# Patient Record
Sex: FEMALE | Race: WHITE | Employment: OTHER | URBAN - METROPOLITAN AREA
[De-identification: names, ages, dates, MRNs, and addresses within clinical notes are randomized per-mention and may not be internally consistent; named-entity substitution may affect disease eponyms.]

---

## 2024-01-10 ENCOUNTER — OFFICE VISIT (OUTPATIENT)
Facility: CLINIC | Age: 59
End: 2024-01-10

## 2024-01-10 VITALS
HEART RATE: 75 BPM | HEIGHT: 66 IN | BODY MASS INDEX: 24.43 KG/M2 | WEIGHT: 152 LBS | SYSTOLIC BLOOD PRESSURE: 123 MMHG | DIASTOLIC BLOOD PRESSURE: 70 MMHG

## 2024-01-10 DIAGNOSIS — K52.9 ILEITIS: Primary | ICD-10-CM

## 2024-01-10 PROCEDURE — 3008F BODY MASS INDEX DOCD: CPT | Performed by: INTERNAL MEDICINE

## 2024-01-10 PROCEDURE — 3078F DIAST BP <80 MM HG: CPT | Performed by: INTERNAL MEDICINE

## 2024-01-10 PROCEDURE — 3074F SYST BP LT 130 MM HG: CPT | Performed by: INTERNAL MEDICINE

## 2024-01-10 PROCEDURE — 99204 OFFICE O/P NEW MOD 45 MIN: CPT | Performed by: INTERNAL MEDICINE

## 2024-01-10 RX ORDER — PANTOPRAZOLE SODIUM 40 MG/1
TABLET, DELAYED RELEASE ORAL
COMMUNITY

## 2024-01-10 RX ORDER — ZINC SULFATE 50(220)MG
220 CAPSULE ORAL DAILY
COMMUNITY

## 2024-01-10 RX ORDER — DICLOFENAC POTASSIUM 25 MG/1
TABLET, FILM COATED ORAL AS DIRECTED
COMMUNITY

## 2024-01-10 RX ORDER — AMLODIPINE BESYLATE 5 MG/1
5 TABLET ORAL DAILY
COMMUNITY
Start: 2023-08-10

## 2024-01-10 NOTE — PROGRESS NOTES
Ros Bourne is a 58 year old female.    HPI:     Chief Complaint   Patient presents with    Follow - Up       The patient is a 58-year-old female who has a history of thyroid disorder, prior hip surgery and colonoscopy who was noted to have ileitis who is seen today for GI consultation.    The patient's colonoscopy was done at [facility] through [facility] in Nov 2023 (colon cancer screening ).  I do not have the colonoscopy report but pathology was reviewed and showed evidence of inflammation in the terminal ileum, biopsy of the colon were normal.    She otherwise denies any symptoms of colitis or Crohn's.  She has no weight loss, change in bowel habits or diarrhea.  She does get occasional looser stools after drinking wine.  She is on digestive enzymes and sees a holistic doctor.  She denies any family history of inflammatory bowel disease/Crohn's, she does have autoimmune in the family.    Jeanie Doshi    HISTORY:  History reviewed. No pertinent past medical history.   History reviewed. No pertinent surgical history.   History reviewed. No pertinent family history.   Social History:   Social History     Socioeconomic History    Marital status: Unknown   Tobacco Use    Smoking status: Some Days    Smokeless tobacco: Never   Substance and Sexual Activity    Alcohol use: Yes     Comment: Occasional    Drug use: Yes     Types: Cannabis        Medications (Active prior to today's visit):  Current Outpatient Medications   Medication Sig Dispense Refill    Diclofenac Potassium 25 MG Oral Tab Take by mouth As Directed.      amLODIPine 5 MG Oral Tab Take 1 tablet (5 mg total) by mouth daily.      pantoprazole 40 MG Oral Tab EC TAKE 1 TABLET BY MOUTH EVERY DAY FOR 60 DAYS      multivitamin Oral Chew Tab Chew 1 tablet by mouth daily.      zinc sulfate 220 (50 Zn) MG Oral Cap Take 1 capsule (220 mg total) by mouth daily.      Levothyroxine Sodium 137 MCG Oral Tab Take 137 mcg by mouth before breakfast.          Allergies:  Allergies   Allergen Reactions    Tetrix UNKNOWN    Latex RASH     Direct contact         ROS:   The patient denies any chest pain or shortness of breath,  No neurologic or dermatologic symptoms.     PHYSICAL EXAM:   Blood pressure 123/70, pulse 75, height 5' 6\" (1.676 m), weight 152 lb (68.9 kg).    The patient appears their stated age and is in no acute distress  HEENT- anicteric sclera, neck no lymphadnopathy, OP- clear with no masses or lesions  Chest- Clear bilaterally, no wheezing,  Heart- regular rate, no murmur or gallop  Abdomen- Soft and nontender, nondistended  Ext- no clubbing or cyanosis  Skin- no rashes or lesions  Neuro- appropriate response, alert, no confusion  .  ASSESSMENT/PLAN:   Assessment     Terminal ileitis    Patient has ileitis noted on recent colonoscopy.  Discussed the possible causes including potential related to medication/aspirin or NSAIDs.  Her colonoscopy was about 2 to 3 months after her recent hip surgery.  Not sure if this could have played a role in the pattern.  Patient does not really have symptoms ascribable to IBD.  Discussed the potential role of treatment.  I would like to set up a stool test to check for inflammation and would likely repeat a colonoscopy in about a years time to determine if there is any escalation.  At this point we will hold off on any further therapy.  We discussed treatment of IBD/Crohn's particular in light of the fact that if we do establish a firm diagnosis.  Patient agrees the above approach.  Risks of progression reviewed.    Plan  Ileitis noted on colonoscopy - possible Crohns  - check stool test for inflammation  - avoid NSAIDS /aspirin  - discussed possible therapies  - records from prior colonoscopy  - follow up colonoscopy in 1 year    Call if symptoms.        Orders This Visit:  No orders of the defined types were placed in this encounter.      Meds This Visit:  Requested Prescriptions      No prescriptions requested or  ordered in this encounter       Imaging & Referrals:  None       Tulio Kwong MD  [facility]

## 2024-01-10 NOTE — PATIENT INSTRUCTIONS
Ileitis noted on colonoscopy - possible Crohns  - check stool test for inflammation  - avoid NSAIDS /aspirin  - discussed possible therapies  - records from prior colonoscopy  - follow up colonoscopy in 1 year    Call if symptoms.

## 2024-01-12 ENCOUNTER — LAB ENCOUNTER (OUTPATIENT)
Dept: LAB | Facility: HOSPITAL | Age: 59
End: 2024-01-12
Attending: INTERNAL MEDICINE
Payer: COMMERCIAL

## 2024-01-12 ENCOUNTER — TELEPHONE (OUTPATIENT)
Facility: CLINIC | Age: 59
End: 2024-01-12

## 2024-01-12 DIAGNOSIS — K52.9 ILEITIS: ICD-10-CM

## 2024-01-12 PROCEDURE — 83993 ASSAY FOR CALPROTECTIN FECAL: CPT

## 2024-01-12 NOTE — TELEPHONE ENCOUNTER
Dr. Kwong    Colonoscopy report dated 11/28/2023 from [facility] received and placed on your desk for review.    Thank you

## 2024-01-15 LAB — CALPROTECTIN STL-MCNT: 113 ΜG/G (ref ?–50)

## 2024-01-25 ENCOUNTER — TELEPHONE (OUTPATIENT)
Facility: CLINIC | Age: 59
End: 2024-01-25

## 2024-01-25 NOTE — TELEPHONE ENCOUNTER
Chitra    Would you be willing to give input on fecal calprotectin results since Dr. Kwong out until Tuesday?    Thank you

## 2024-01-29 NOTE — TELEPHONE ENCOUNTER
Dr. Elenita PUCKETT    I have her scheduled for a video visit with you tomorrow at 4pm. She could not do 3pm due to physical therapy    Thank you    I reviewed below complete message from Dr. Kwong with Ros.  She voiced understanding and accepted virtual visit for tomorrow at 4pm.  I just need someone with access to open it.  I reviewed how to log into [facility] and how to set up virtual visit.  Aware she needs to to E Check in beforehand.    Your Appointments      Tuesday January 30, 2024  4:00 PM  Video Visit with Tulio Kwong MD  [facility] [address] [address] [phone]   Please verify your telehealth insurance benefits prior to your appointment.    You must be in the [facility] during the virtual visit.     Please use the [facility] [employer] and launch the video visit 10 minutes prior to your scheduled appointment time to ensure your camera and microphone are working properly. Once the video visit has started you will be placed in a waiting room until the provider begins the visit.     You will receive an email confirmation with instructions.  If you have questions, call your doctor's office directly.    If you are having issues or need to use a desktop/laptop, please follow the below steps:        1.       Close out all other open apps (could be competing for audio resources)  2.       Disable Bluetooth  3.       Reboot mobile device before joining the video  4.       Come off Wi-Fi and switch over to Data    Please see our Video Visit Tip Sheet if you need additional assistance.     If you believe this is an emergency, please dial 911 immediately.

## 2024-01-29 NOTE — TELEPHONE ENCOUNTER
RN to contact the patient, her stool test is subtly elevated/close to borderline.  I have reviewed her prior colonoscopy report and it is as we discussed, consistent with small focal erosions/ulcerations in the ileum and can be seen with Crohn's disease.    Would like to set up a telemedicine visit to discuss her options, I have 1 idea that may try mesalamine which is a pill I discussed with her that has very minimal side effect profile but be helpful to cut down any irritation in her small bowel.  We could then recheck the stool test in 6 months to see if removed.  This may be a way to follow her response and inflammation.    I be willing to set up a telemedicine on Tuesday afternoons, after 3 PM, Thursday afternoon after 3 PM or Friday afternoon after 4 PM

## 2024-01-30 ENCOUNTER — TELEMEDICINE (OUTPATIENT)
Facility: CLINIC | Age: 59
End: 2024-01-30
Payer: COMMERCIAL

## 2024-01-30 DIAGNOSIS — K52.9 ILEITIS: Primary | ICD-10-CM

## 2024-01-30 PROCEDURE — 99442 PHONE E/M BY PHYS 11-20 MIN: CPT | Performed by: INTERNAL MEDICINE

## 2024-01-30 NOTE — PROGRESS NOTES
Virtual Telephone Check-In    Ros Bourne verbally consents to a Virtual/Telephone Check-In visit on 01/30/24.  Patient has been referred to the [facility] website at [email or web address] to review the yearly Consent to Treat document.    Patient understands and accepts financial responsibility for any deductible, co-insurance and/or co-pays associated with this service.    Duration of the service: 18 minutes      Summary of topics discussed:     I have reviewed the patient's stool test with her, her fecal calprotectin was subtly elevated but tracked only in the border line range.  Patient has no symptoms of Crohn's.  She does use NSAIDs and continues to smoke several cigarettes a week.    We discussed lifestyle adjustments, cutting out NSAIDs if possible (she takes it for recent hip replacement) and repeating stool test/fecal calprotectin in 6 months time.  Differential was discussed and includes Crohn's disease.  Patient has no symptoms at this time, blood work is looked okay.  Like to see if her numbers improved.    We do plan for colonoscopy in 1 years time to reevaluate, we could consider CT enterography if ongoing symptoms.    Tulio Kwong MD

## 2024-08-14 ENCOUNTER — APPOINTMENT (OUTPATIENT)
Dept: LAB | Facility: HOSPITAL | Age: 59
End: 2024-08-14
Attending: INTERNAL MEDICINE
Payer: COMMERCIAL

## 2024-08-14 PROCEDURE — 83993 ASSAY FOR CALPROTECTIN FECAL: CPT

## 2024-08-16 ENCOUNTER — LAB ENCOUNTER (OUTPATIENT)
Dept: LAB | Facility: HOSPITAL | Age: 59
End: 2024-08-16
Attending: INTERNAL MEDICINE
Payer: COMMERCIAL

## 2024-08-16 DIAGNOSIS — K52.9 ILEITIS: ICD-10-CM

## 2024-08-17 LAB — CALPROTECTIN STL-MCNT: 16.3 ΜG/G (ref ?–50)

## 2024-08-21 ENCOUNTER — TELEPHONE (OUTPATIENT)
Facility: CLINIC | Age: 59
End: 2024-08-21

## 2024-08-21 NOTE — TELEPHONE ENCOUNTER
Results faxed to primary care provider  Oriana Burnett MD   [address] [place] [id number]   [phone] (Work)   [phone] (Fax)     Patient viewed below result note in MyChart:  Seen by patient Ros Bourne on 8/20/2024  5:53 PM

## 2024-08-21 NOTE — TELEPHONE ENCOUNTER
----- Message from Tulio Kwong sent at 8/20/2024  5:51 PM CDT -----  Stool test negative for inflammation -this would go against active inflammation/active Crohn's disease.  I would advise you follow-up in the office in January and can discuss any symptoms or issues or concerns.  Ongoing monitoring with repeat stool test in 2025.    RN to fax over results to PCP

## 2024-09-20 ENCOUNTER — TELEPHONE (OUTPATIENT)
Facility: CLINIC | Age: 59
End: 2024-09-20

## 2024-09-20 DIAGNOSIS — K52.9 ILEITIS: ICD-10-CM

## 2024-09-20 DIAGNOSIS — Z12.11 COLON CANCER SCREENING: Primary | ICD-10-CM

## 2024-09-20 NOTE — TELEPHONE ENCOUNTER
Chart reviewed, advised colonoscopy 1 year from 2023 exam so ok to set up in Jan 2025.    Colonoscopy for ileitis /CRC screening  Golytely prep - need pharmacy to send prep  MAC     Please see if the pt needs to take abx before procedure for here prior hip surgery

## 2024-09-20 NOTE — TELEPHONE ENCOUNTER
Dr. Kwong    Telemedicine visit from 1/30/24 says to plan for colonoscopy in 1 years time.  Result note for stool test says follow up in office in January.  Do you just want her to follow up in office in January given stool test results or should she still plan for that 1 year recall colonoscopy which would be due soon?    Thank you

## 2024-09-20 NOTE — TELEPHONE ENCOUNTER
Patient calling states had a stool test and inflammation came back negative. States if she has to do colonoscopy in December. Please call and advise.

## 2024-09-23 NOTE — TELEPHONE ENCOUNTER
Schedulers:  patient informed of below.  Can you please call patient to schedule?  Please route encounter back to RN once scheduled - per patient Dr. Rhoades will likely want her to take antibiotics pre procedure so we will have to follow up with that once a date is set up.    Thank you

## 2024-09-25 NOTE — TELEPHONE ENCOUNTER
Prep kit sent to pharmacy at [facility] on [address] in [place]. No further actions needed.

## 2024-09-25 NOTE — TELEPHONE ENCOUNTER
Scheduled for:  Colonoscopy [address]  Provider Name:  Dr. Kwong  Date:  1/7/2025  Location:   [facility]  Sedation:  MAC  Time:  12:30 PM - Patient is aware NE will call the day before with arrival time.  Prep:  Golytely  Meds/Allergies Reconciled?:  Physician reviewed   Diagnosis with codes:  Colon cancer screening Z12.11; Ileitis K52.9  Was patient informed to call insurance with codes (Y/N):  Yes, I confirmed [employer] PPO insurance with the patient.   Referral sent?:  Referral was sent at the time of electronic surgical scheduling.   EM or [facility] notified?:  I sent an electronic request to Endo Scheduling and received a confirmation today.   Medication Orders: Hold multivitamins/supplements one week prior to procedure.  Misc Orders:  May need to take antibiotic prior to procedure - GI RN will follow up with Dr. Rhoades.     Further instructions given by staff: I discussed the prep instructions with the patient which she verbally understood and is aware that I will send the instructions today.

## 2024-09-25 NOTE — TELEPHONE ENCOUNTER
GI RNs -    Patient is scheduled for a colon[facility] Please follow up with Dr. Rhoades regarding the antibiotic patient may needs to take prior to the procedure.    Thank you!

## 2024-09-25 NOTE — TELEPHONE ENCOUNTER
GI MAs -    Please resend bowel prep to the [facility] at [address] [facility] [phone] (pharmacy on file is closed).    Thank you!

## 2024-12-30 ENCOUNTER — TELEPHONE (OUTPATIENT)
Facility: CLINIC | Age: 59
End: 2024-12-30

## 2024-12-30 NOTE — TELEPHONE ENCOUNTER
Called patient - went over prep instructions and what diet she should follow prior to the procedure. Also, informed her to check her Cl[employer] message we sent in September to review the prep instructions.    No further action at this time.

## 2024-12-30 NOTE — TELEPHONE ENCOUNTER
Schedulers:  patient requesting to review prep.  Can you please assist?  I sent prep to pharmacy again as requested.  I will get back to her about antibiotics pre procedure-waiting for MD to respond.    Thank you

## 2024-12-30 NOTE — TELEPHONE ENCOUNTER
Patient called request the preparation for the procedure. Please send the preparation to the [facility] pharmacy in [place] on [address].     Patient also asked to go over preparation instructions. Please call.

## 2024-12-31 ENCOUNTER — TELEPHONE (OUTPATIENT)
Facility: CLINIC | Age: 59
End: 2024-12-31

## 2024-12-31 NOTE — TELEPHONE ENCOUNTER
Patient states that she is returning an RN's call from this morning, regarding an Antibiotic before 1/7/25 Colonoscopy.  Please call

## 2024-12-31 NOTE — TELEPHONE ENCOUNTER
Tulio Kwong MD       12/30/24  5:46 PM  Note     Amoxicillin antibiotic 2000 mg to be taken preprocedure, sent to [facility] pharmacy as per request.

## 2025-01-06 ENCOUNTER — TELEPHONE (OUTPATIENT)
Facility: CLINIC | Age: 60
End: 2025-01-06

## 2025-01-06 NOTE — TELEPHONE ENCOUNTER
Patient is scheduled for a Colonoscopy 1/7/25.  She was prescribed an Antibiotic to take prior to the procedure due to a joint replacement a year ago, but she needs to know when to take it.  Please call

## 2025-01-06 NOTE — TELEPHONE ENCOUNTER
Patient contacted  Reviewed she is to take the antibiotic 1 hour prior to procedure  All questions answered.

## 2025-01-06 NOTE — TELEPHONE ENCOUNTER
Patient is calling for directions for 1/7/25 Colonoscopy.   She states that she put milk in her coffee this morning.  Please call

## 2025-01-06 NOTE — TELEPHONE ENCOUNTER
Patient contacted.  Aware the milk in her coffee this morning was ok because allowed light breakfast then only clear liquids.  All questions answered to patient's satisfaction.

## 2025-01-07 ENCOUNTER — HOSPITAL ENCOUNTER (OUTPATIENT)
Age: 60
Setting detail: HOSPITAL OUTPATIENT SURGERY
Discharge: HOME | End: 2025-01-07
Attending: INTERNAL MEDICINE | Admitting: INTERNAL MEDICINE
Payer: COMMERCIAL

## 2025-01-07 ENCOUNTER — ANESTHESIA (OUTPATIENT)
Dept: ENDOSCOPY | Age: 60
End: 2025-01-07
Payer: COMMERCIAL

## 2025-01-07 ENCOUNTER — ANESTHESIA EVENT (OUTPATIENT)
Dept: ENDOSCOPY | Age: 60
End: 2025-01-07
Payer: COMMERCIAL

## 2025-01-07 VITALS
WEIGHT: 140 LBS | RESPIRATION RATE: 15 BRPM | DIASTOLIC BLOOD PRESSURE: 67 MMHG | BODY MASS INDEX: 22.5 KG/M2 | HEART RATE: 58 BPM | SYSTOLIC BLOOD PRESSURE: 144 MMHG | OXYGEN SATURATION: 98 % | HEIGHT: 66 IN

## 2025-01-07 DIAGNOSIS — K52.9 ILEITIS: ICD-10-CM

## 2025-01-07 DIAGNOSIS — Z12.11 COLON CANCER SCREENING: ICD-10-CM

## 2025-01-07 PROCEDURE — 45385 COLONOSCOPY W/LESION REMOVAL: CPT | Performed by: INTERNAL MEDICINE

## 2025-01-07 PROCEDURE — 99070 SPECIAL SUPPLIES PHYS/QHP: CPT | Performed by: INTERNAL MEDICINE

## 2025-01-07 PROCEDURE — 45380 COLONOSCOPY AND BIOPSY: CPT | Performed by: INTERNAL MEDICINE

## 2025-01-07 PROCEDURE — 88305 TISSUE EXAM BY PATHOLOGIST: CPT | Performed by: INTERNAL MEDICINE

## 2025-01-07 RX ORDER — NALOXONE HYDROCHLORIDE 0.4 MG/ML
0.08 INJECTION, SOLUTION INTRAMUSCULAR; INTRAVENOUS; SUBCUTANEOUS ONCE AS NEEDED
Status: DISCONTINUED | OUTPATIENT
Start: 2025-01-07 | End: 2025-01-07

## 2025-01-07 RX ORDER — SODIUM CHLORIDE, SODIUM LACTATE, POTASSIUM CHLORIDE, CALCIUM CHLORIDE 600; 310; 30; 20 MG/100ML; MG/100ML; MG/100ML; MG/100ML
INJECTION, SOLUTION INTRAVENOUS CONTINUOUS
Status: DISCONTINUED | OUTPATIENT
Start: 2025-01-07 | End: 2025-01-07

## 2025-01-07 RX ORDER — LIDOCAINE HYDROCHLORIDE 10 MG/ML
INJECTION, SOLUTION EPIDURAL; INFILTRATION; INTRACAUDAL; PERINEURAL AS NEEDED
Status: DISCONTINUED | OUTPATIENT
Start: 2025-01-07 | End: 2025-01-07 | Stop reason: SURG

## 2025-01-07 RX ADMIN — SODIUM CHLORIDE, SODIUM LACTATE, POTASSIUM CHLORIDE, CALCIUM CHLORIDE: 600; 310; 30; 20 INJECTION, SOLUTION INTRAVENOUS at 10:35:00

## 2025-01-07 RX ADMIN — LIDOCAINE HYDROCHLORIDE 30 MG: 10 INJECTION, SOLUTION EPIDURAL; INFILTRATION; INTRACAUDAL; PERINEURAL at 10:36:00

## 2025-01-07 NOTE — OPERATIVE REPORT
[facility] Endoscopy Report    Date of procedure-January 7, 2025    Preoperative Diagnosis:  -Ileitis history  -Colorectal cancer screening      Postoperative Diagnosis:  -Colon polyps x 2  -Diverticulosis  -Internal hemorrhoids  -Normal terminal ileum    Procedure:    Colonoscopy       Surgeon:  Tulio Kwong M.D.    Anesthesia:  MAC     Technique:  After informed consent, the patient was placed in the left lateral recumbent position.  Digital rectal examination revealed no palpable intraluminal abnormalities.  An Olympus variable stiffness 190 series HD colonoscope was inserted into the rectum and advanced under direct vision by following the lumen to the cecum.  The colon was examined upon withdrawal in the left lateral position.    The procedure was well tolerated without immediate complication.      Findings:  The preparation of the colon was good.  The terminal ileum was examined for 4 cm and visually normal-biopsies taken from the terminal ileum.  The ileocecal valve was well preserved. The visualized colonic mucosa from the cecum to the anal verge was normal with an intact vascular pattern.    Colon polyps x 2 removed as follows;  -Descending x 1, sessile 5 mm in size and cold snare removed.  -Sigmoid x 1, diminutive removed by cold forceps technique.  Both polypectomy sites inspected and found to be free bleeding specimens retrieved and sent for analysis.    Diverticulosis located in the sigmoid and descending colon, no diverticulitis.    Small internal hemorrhoids noted on retroflexed view.    Estimated blood loss-insignificant  Specimens-see above    Impression:  -Colon polyps x 2  -Diverticulosis  -Internal hemorrhoids  -Normal terminal ileum    Recommendations:  - Post polypectomy instructions given  - Repeat colonoscopy in 5 years  - High fiber diet for diverticular disease  - Symptomatic treatment of hemorrhoids      Tulio Kwong MD  1/7/2025  11:06 AM

## 2025-01-07 NOTE — DISCHARGE INSTRUCTIONS
Home Care Instructions for Colonoscopy with Sedation    Diet:  - Resume your regular diet as tolerated unless otherwise instructed.  - Start with light meals to minimize bloating.  - Do not drink alcohol today.    Medication:  - If you have questions about resuming your normal medications, please contact your Primary Care Physician.    Activities:  - Take it easy today. Do not return to work today.  - Do not drive today.  - Do not operate any machinery today (including kitchen equipment).    Colonoscopy:  - You may notice some rectal \"spotting\" (a little blood on the toilet tissue) for a day or two after the exam. This is normal.  - If you experience any rectal bleeding (not spotting), persistent tenderness or sharp severe abdominal pains, oral temperature over 100 degrees Fahrenheit, light-headedness or dizziness, or any other problems, contact your doctor.    **If unable to reach your doctor, please go to the [facility] Emergency Room**    - Your referring physician will receive a full report of your examination.  - If you do not hear from your doctor's office within two weeks of your biopsy, please call them for your results.    You may be able to see your laboratory results in [facility] between 4 and 7 business days.  In some cases, your physician may not have viewed the results before they are released to [facility].  If you have questions regarding your results contact the physician who ordered the test/exam by phone or via [facility] by choosing \"Ask a Medical Question.\"

## 2025-01-07 NOTE — ANESTHESIA PREPROCEDURE EVALUATION
Anesthesia PreOp Note    HPI:     Ros Bourne is a 59 year old female who presents for preoperative consultation requested by: Tulio Kwong MD    Date of Surgery: 1/7/2025    Procedure(s):  COLONOSCOPY  Indication: Colon cancer screening /Ileitis    Relevant Problems   No relevant active problems       NPO:  Last Liquid Consumption Date: 01/07/25  Last Liquid Consumption Time: 0800  Last Solid Consumption Date: 01/06/25  Last Solid Consumption Time: 1100  Last Liquid Consumption Date: 01/07/25          History Review:  There are no active problems to display for this patient.      Past Medical History:    Disorder of thyroid    High blood pressure       Past Surgical History:   Procedure Laterality Date    Colonoscopy      Thyroidectomy      Total hip replacement Bilateral        Prescriptions Prior to Admission[1]  Current Medications and Prescriptions Ordered in Epic[2]    Allergies[3]    History reviewed. No pertinent family history.  Social History     Socioeconomic History    Marital status: Single   Tobacco Use    Smoking status: Some Days    Smokeless tobacco: Never    Tobacco comments:     occasionally   Vaping Use    Vaping status: Never Used   Substance and Sexual Activity    Alcohol use: Yes     Comment: Occasional    Drug use: Yes     Types: Cannabis     Comment: sleep gummies, occasional       Available pre-op labs reviewed.             Vital Signs:  Body mass index is 22.6 kg/m².   height is 1.676 m (5' 6\") and weight is 63.5 kg (140 lb). Her blood pressure is 124/82 and her pulse is 57. Her respiration is 10 and oxygen saturation is 99%.   Vitals:    12/30/24 1517 01/07/25 0945   BP:  124/82   Pulse:  57   Resp:  10   SpO2:  99%   Weight: 63.5 kg (140 lb)    Height: 1.676 m (5' 6\")         Anesthesia Evaluation     Patient summary reviewed and Nursing notes reviewed    No history of anesthetic complications   Airway   Mallampati: II  TM distance: <3 FB  Neck ROM: full  Dental - Dentition  appears grossly intact     Pulmonary - negative ROS and normal exam   Cardiovascular - normal exam  (+) hypertension    Neuro/Psych - negative ROS     GI/Hepatic/Renal - negative ROS     Endo/Other    (+) hypothyroidism  Abdominal                  Anesthesia Plan:   ASA:  2  Plan:   MAC  Informed Consent Plan and Risks Discussed With:  Patient      I have informed Ros Bourne and/or legal guardian or family member of the nature of the anesthetic plan, benefits, risks including possible dental damage if relevant, major complications, and any alternative forms of anesthetic management.   All of the patient's questions were answered to the best of my ability. The patient desires the anesthetic management as planned.  Valorie Cooper MD  1/7/2025 10:12 AM  Present on Admission:  **None**           [1]   Medications Prior to Admission   Medication Sig Dispense Refill Last Dose/Taking    Bright, Zingiber officinalis, (BRIGHT OR) Take by mouth.   12/31/2024    NON FORMULARY Immune Wellness Blend   Taking    amoxicillin 500 MG Oral Tab Take 4 tablets (2,000 mg total) by mouth one time for 1 dose. 4 tablet 0 1/7/2025 Morning    amLODIPine 5 MG Oral Tab Take 1 tablet (5 mg total) by mouth daily.   1/6/2025 Morning    multivitamin Oral Chew Tab Chew 1 tablet by mouth daily.   12/31/2024    zinc sulfate 220 (50 Zn) MG Oral Cap Take 1 capsule (220 mg total) by mouth daily.   12/31/2024    Levothyroxine Sodium 137 MCG Oral Tab Take 137 mcg by mouth before breakfast.   1/6/2025 Morning    polyethylene glycol, PEG 3350-KCl-NaBcb-NaCl-NaSulf, 236 g Oral Recon Soln Take 4,000 mL by mouth As Directed. May substitute prescription with Golytely/Nulytely/Gavilyte and or generic equivalent, if needed. Take bowel preparation as provided by Gastroenterology office, or visit our website at [email or web address] 4000 mL 0    [2]   Current Facility-Administered Medications Ordered in Epic    Medication Dose Route Frequency Provider Last Rate Last Admin    lactated ringers infusion   Intravenous Continuous Tulio Kwong MD 20 mL/hr at 01/07/25 0930 New Bag at 01/07/25 0930     No current [facility]-ordered outpatient medications on file.   [3]   Allergies  Allergen Reactions    Tetracyclines & Related HIVES and OTHER (SEE COMMENTS)     Throat scratchy and itchy 30 years ago.    Throat scratchy and itchy 40 years ago.    Tetrix UNKNOWN    Latex RASH     Direct contact

## 2025-01-07 NOTE — ANESTHESIA POSTPROCEDURE EVALUATION
Patient: Ros Bourne    Procedure Summary       Date: 01/07/25 Room / Location: [facility] ENDOSCOPY 01 / [facility] ENDO    Anesthesia Start: 1034 Anesthesia Stop: 1106    Procedure: COLONOSCOPY Diagnosis:       Colon cancer screening      Ileitis      (polyps, hemorrhoids, diverticulosis)    Surgeons: Tulio Kwong MD Anesthesiologist: Valorie Cooper MD    Anesthesia Type: MAC ASA Status: 2            Anesthesia Type: MAC    Vitals Value Taken Time   /65 01/07/25 1105   Temp  01/07/25 1106   Pulse 74 01/07/25 1105   Resp 17 01/07/25 1105   SpO2 94 % 01/07/25 1105   Vitals shown include unfiled device data.    [facility] AN Post Evaluation:   Patient Evaluated in PACU  Patient Participation: complete - patient participated  Level of Consciousness: sleepy but conscious  Pain Score: 0  Pain Management: adequate  Airway Patency:patent  Yes    Nausea/Vomiting: none  Cardiovascular Status: acceptable  Respiratory Status: acceptable  Postoperative Hydration acceptable      Valorie Cooper MD  1/7/2025 11:06 AM

## 2025-01-07 NOTE — H&P
History & Physical Examination    Patient Name: Ros Bourne  MRN: [id number]  [facility]: [id number]  YOB: 1965    Diagnosis:   CRC screening  Hx ileitis      Prescriptions Prior to Admission[1]  Current Facility-Administered Medications   Medication Dose Route Frequency    lactated ringers infusion   Intravenous Continuous       Allergies: Allergies[2]    Past Medical History:    Disorder of thyroid    High blood pressure     Past Surgical History:   Procedure Laterality Date    Colonoscopy      Thyroidectomy      Total hip replacement Bilateral      History reviewed. No pertinent family history.  Social History     Tobacco Use    Smoking status: Some Days    Smokeless tobacco: Never    Tobacco comments:     occasionally   Substance Use Topics    Alcohol use: Yes     Comment: Occasional       SYSTEM Check if Review is Normal Check if Physical Exam is Normal If not normal, please explain:   HEENT [x ] [ x]    NECK & BACK [x ] [x ]    HEART [x ] [ x]    LUNGS [x ] [ x]    ABDOMEN [x ] [x ]    UROGENITAL [ ] [ ]    EXTREMITIES [x ] [x ]    OTHER        [ x ] I have discussed the risks and benefits and alternatives with the patient/family.  They understand and agree to proceed with plan of care.  [ x ] I have reviewed the History and Physical done within the last 30 days.  Any changes noted above.    Tulio Kwong MD  1/7/2025  10:27 AM         [1]   Medications Prior to Admission   Medication Sig Dispense Refill Last Dose/Taking    Sandhya, Zingiber officinalis, (SANDHYA OR) Take by mouth.   12/31/2024    NON FORMULARY Immune Wellness Blend   Taking    amoxicillin 500 MG Oral Tab Take 4 tablets (2,000 mg total) by mouth one time for 1 dose. 4 tablet 0 1/7/2025 Morning    amLODIPine 5 MG Oral Tab Take 1 tablet (5 mg total) by mouth daily.   1/6/2025 Morning    multivitamin Oral Chew Tab Chew 1 tablet by mouth daily.   12/31/2024    zinc sulfate 220 (50 Zn) MG Oral Cap Take 1 capsule (220 mg total) by  mouth daily.   12/31/2024    Levothyroxine Sodium 137 MCG Oral Tab Take 137 mcg by mouth before breakfast.   1/6/2025 Morning    polyethylene glycol, PEG 3350-KCl-NaBcb-NaCl-NaSulf, 236 g Oral Recon Soln Take 4,000 mL by mouth As Directed. May substitute prescription with Golytely/Nulytely/Gavilyte and or generic equivalent, if needed. Take bowel preparation as provided by Gastroenterology office, or visit our website at [email or web address] 4000 mL 0    [2]   Allergies  Allergen Reactions    Tetracyclines & Related HIVES and OTHER (SEE COMMENTS)     Throat scratchy and itchy 30 years ago.    Throat scratchy and itchy 40 years ago.    Tetrix UNKNOWN    Latex RASH     Direct contact

## 2025-01-10 ENCOUNTER — TELEPHONE (OUTPATIENT)
Facility: CLINIC | Age: 60
End: 2025-01-10

## 2025-01-10 NOTE — TELEPHONE ENCOUNTER
----- Message from Tulio Kwong sent at 1/9/2025  6:23 PM CST -----  Colonoscopy results look okay, no signs of inflammation in the terminal ileum as clinically questioned.  Colon polyps x 2 removed which were benign.  Would advise recall colonoscopy in 5 years time.    Call or follow-up with questions or issues.

## 2025-01-10 NOTE — TELEPHONE ENCOUNTER
Health Maintenance Updated.    5 year colonoscopy recall entered into patient outreach in [facility].  Next colonoscopy will be due 1/7/2030.    Left message on voicemail.

## 2025-01-20 ENCOUNTER — TELEPHONE (OUTPATIENT)
Facility: CLINIC | Age: 60
End: 2025-01-20

## 2025-01-20 NOTE — TELEPHONE ENCOUNTER
Patient called to speak with a nurse in regards to a bill that she received from Dr. Kwong's office, patient isn't sure why it did not go through her insurance. Please call.

## 2025-01-20 NOTE — TELEPHONE ENCOUNTER
I called the patient back.  She told me that the issue is already resolved, she just needed to update the insurance we had on file.

## (undated) DEVICE — KIT CLEAN ENDOKIT 1.1OZ GOWNX2

## (undated) DEVICE — LASSO POLYPECTOMY SNARE: Brand: LASSO

## (undated) DEVICE — MEDI-VAC NON-CONDUCTIVE SUCTION TUBING 6MM X 1.8M (6FT.) L: Brand: CARDINAL HEALTH

## (undated) DEVICE — GIJAW SINGLE-USE BIOPSY FORCEPS WITH NEEDLE: Brand: GIJAW

## (undated) DEVICE — 60 ML SYRINGE REGULAR TIP: Brand: MONOJECT

## (undated) DEVICE — KIT ENDO ORCAPOD 160/180/190

## (undated) DEVICE — V2 SPECIMEN COLLECTION MANIFOLD KIT: Brand: NEPTUNE

## (undated) DEVICE — Device

## (undated) NOTE — LETTER
[facility]  [address], [place], [place]    Authorization for Surgical Operation and Procedure                               I hereby authorize Tulio Kwong MD, my physician and his/her assistants (if applicable), which may include medical students, residents, and/or fellows, to perform the following surgical operation/ procedure and administer such anesthesia as may be determined necessary by my physician: Operation/Procedure name (s) COLONOSCOPY on Ros Bourne   2.   I recognize that during the surgical operation/procedure, unforeseen conditions may necessitate additional or different procedures than those listed above.  I, therefore, further authorize and request that the above-named surgeon, assistants, or designees perform such procedures as are, in their judgment, necessary and desirable.    3.   My surgeon/physician has discussed prior to my surgery the potential benefits, risks and side effects of this procedure; the likelihood of achieving goals; and potential problems that might occur during recuperation.  They also discussed reasonable alternatives to the procedure, including risks, benefits, and side effects related to the alternatives and risks related to not receiving this procedure.  I have had all my questions answered and I acknowledge that no guarantee has been made as to the result that may be obtained.    4.   Should the need arise during my operation/procedure, which includes change of level of care prior to discharge, I also consent to the administration of blood and/or blood products.  Further, I understand that despite careful testing and screening of blood or blood products by collecting agencies, I may still be subject to ill effects as a result of receiving a blood transfusion and/or blood products.  The following are some, but not all, of the potential risks that can occur: fever and allergic reactions, hemolytic reactions, transmission of diseases such  as Hepatitis, AIDS and Cytomegalovirus (CMV) and fluid overload.  In the event that I wish to have an autologous transfusion of my own blood, or a directed donor transfusion, I will discuss this with my physician.  Check only if Refusing Blood or Blood Products  I understand refusal of blood or blood products as deemed necessary by my physician may have serious consequences to my condition to include possible death. I hereby assume responsibility for my refusal and release the hospital, its personnel, and my physicians from any responsibility for the consequences of my refusal.    o  Refuse   5.   I authorize the use of any specimen, organs, tissues, body parts or foreign objects that may be removed from my body during the operation/procedure for diagnosis, research or teaching purposes and their subsequent disposal by hospital authorities.  I also authorize the release of specimen test results and/or written reports to my treating physician on the hospital medical staff or other referring or consulting physicians involved in my care, at the discretion of the Pathologist or my treating physician.    6.   I consent to the photographing or videotaping of the operations or procedures to be performed, including appropriate portions of my body for medical, scientific, or educational purposes, provided my identity is not revealed by the pictures or by descriptive texts accompanying them.  If the procedure has been photographed/videotaped, the surgeon will obtain the original picture, image, videotape or CD.  The hospital will not be responsible for storage, release or maintenance of the picture, image, tape or CD.    7.   I consent to the presence of a  or observers in the operating room as deemed necessary by my physician or their designees.    8.   I recognize that in the event my procedure results in extended X-Ray/fluoroscopy time, I may develop a skin reaction.    9. If I have a Do Not Attempt  Resuscitation (DNAR) order in place, that status will be suspended while in the operating room, procedural suite, and during the recovery period unless otherwise explicitly stated by me (or a person authorized to consent on my behalf). The surgeon or my attending physician will determine when the applicable recovery period ends for purposes of reinstating the DNAR order.  10. Patients having a sterilization procedure: I understand that if the procedure is successful the results will be permanent and it will therefore be impossible for me to inseminate, conceive, or bear children.  I also understand that the procedure is intended to result in sterility, although the result has not been guaranteed.   11. I acknowledge that my physician has explained sedation/analgesia administration to me including the risk and benefits I consent to the administration of sedation/analgesia as may be necessary or desirable in the judgment of my physician.    I CERTIFY THAT I HAVE READ AND FULLY UNDERSTAND THE ABOVE CONSENT TO OPERATION and/or OTHER PROCEDURE.     ____________________________________  _________________________________        ______________________________  Signature of Patient    Signature of Responsible Person                Printed Name of Responsible Person                                      ____________________________________  _____________________________                ________________________________  Signature of Witness        Date  Time         Relationship to Patient    STATEMENT OF PHYSICIAN My signature below affirms that prior to the time of the procedure; I have explained to the patient and/or his/her legal representative, the risks and benefits involved in the proposed treatment and any reasonable alternative to the proposed treatment. I have also explained the risks and benefits involved in refusal of the proposed treatment and alternatives to the proposed treatment and have answered the patient's  questions. If I have a significant financial interest in a co-management agreement or a significant financial interest in any product or implant, or other significant relationship used in this procedure/surgery, I have disclosed this and had a discussion with my patient.     _____________________________________________________              _____________________________  (Signature of Physician)                                                                                         (Date)                                   (Time)  Patient Name: Ros Bourne      : 8/15/1965      Printed: 2025     Medical Record #: [id number]                                      Page 1 of 1

## (undated) NOTE — LETTER
[facility]  Administration of Anesthesia  IRos agree to be cared for by a physician anesthesiologist alone and/or with a nurse anesthetist, who is specially trained to monitor me and give me medicine to put me to sleep or keep me comfortable during my procedure    I understand that my anesthesiologist and/or anesthetist is not an employee or agent of [facility] or [employer]. He or she works for [facility]    As the patient asking for anesthesia services, I agree to:  Allow the anesthesiologist (anesthesia doctor) to give me medicine and do additional procedures as necessary. Some examples are: Starting or using an “IV” to give me medicine, fluids or blood during my procedure, and having a breathing tube placed to help me breathe when I’m asleep (intubation). In the event that my heart stops working properly, I understand that my anesthesiologist will make every effort to sustain my life, unless otherwise directed by [facility] Do Not Resuscitate documents.  Tell my anesthesia doctor before my procedure:  If I am pregnant.  The last time that I ate or drank.  iii. All of the medicines I take (including prescriptions, herbal supplements, and pills I can buy without a prescription (including street drugs/illegal medications). Failure to inform my anesthesiologist about these medicines may increase my risk of anesthetic complications.  iv.If I am allergic to anything or have had a reaction to anesthesia before.  I understand how the anesthesia medicine will help me (benefits).  I understand that with any type of anesthesia medicine there are risks:  The most common risks are: nausea, vomiting, sore throat, muscle soreness, damage to my eyes, mouth, or teeth (from breathing tube placement).  Rare risks include: remembering what happened during my procedure, allergic reactions to medications, injury to my airway, heart, lungs, vision, nerves, or  muscles and in extremely rare instances death.  My doctor has explained to me other choices available to me for my care (alternatives).  Pregnant Patients (“epidural”):  I understand that the risks of having an epidural (medicine given into my back to help control pain during labor), include itching, low blood pressure, difficulty urinating, headache or slowing of the baby’s heart. Very rare risks include infection, bleeding, seizure, irregular heart rhythms and nerve injury.  Regional Anesthesia (“spinal”, “epidural”, & “nerve blocks”):  I understand that rare but potential complications include headache, bleeding, infection, seizure, irregular heart rhythms, and nerve injury.    _____________________________________________________________________________  Patient (or Representative) Signature/Relationship to Patient  Date   Time    _____________________________________________________________________________   Name (if used)    Language/Organization   Time    _____________________________________________________________________________  Nurse Anesthetist Signature     Date   Time  _____________________________________________________________________________  Anesthesiologist Signature     Date   Time  I have discussed the procedure and information above with the patient (or patient’s representative) and answered their questions. The patient or their representative has agreed to have anesthesia services.    _____________________________________________________________________________  Witness        Date   Time  I have verified that the signature is that of the patient or patient’s representative, and that it was signed before the procedure  Patient Name: Ros Bourne     : 8/15/1965                 Printed: 2025 at 6:44 AM    Medical Record #: [id number]                                            Page 1 of 1  ----------ANESTHESIA CONSENT----------